# Patient Record
Sex: FEMALE | Race: WHITE | NOT HISPANIC OR LATINO | Employment: FULL TIME | URBAN - METROPOLITAN AREA
[De-identification: names, ages, dates, MRNs, and addresses within clinical notes are randomized per-mention and may not be internally consistent; named-entity substitution may affect disease eponyms.]

---

## 2023-05-04 LAB
CHLAMYDIA TRACH., AMPLIFIED: NEGATIVE
N. GONORRHEA, AMPLIFIED: NEGATIVE
TRICHOMONAS VAGINALIS: NEGATIVE

## 2023-07-17 ENCOUNTER — APPOINTMENT (OUTPATIENT)
Dept: PRIMARY CARE | Facility: CLINIC | Age: 23
End: 2023-07-17
Payer: COMMERCIAL

## 2023-10-09 ENCOUNTER — OFFICE VISIT (OUTPATIENT)
Dept: PRIMARY CARE | Facility: CLINIC | Age: 23
End: 2023-10-09
Payer: COMMERCIAL

## 2023-10-09 VITALS
HEIGHT: 66 IN | SYSTOLIC BLOOD PRESSURE: 104 MMHG | HEART RATE: 101 BPM | DIASTOLIC BLOOD PRESSURE: 62 MMHG | WEIGHT: 185.8 LBS | TEMPERATURE: 98.5 F | BODY MASS INDEX: 29.86 KG/M2 | OXYGEN SATURATION: 98 %

## 2023-10-09 DIAGNOSIS — Z00.00 HEALTH MAINTENANCE EXAMINATION: Primary | ICD-10-CM

## 2023-10-09 DIAGNOSIS — Z23 ENCOUNTER FOR IMMUNIZATION: ICD-10-CM

## 2023-10-09 DIAGNOSIS — H61.22 IMPACTED CERUMEN OF LEFT EAR: ICD-10-CM

## 2023-10-09 DIAGNOSIS — R39.15 URINARY URGENCY: ICD-10-CM

## 2023-10-09 LAB
POC APPEARANCE, URINE: ABNORMAL
POC BILIRUBIN, URINE: ABNORMAL
POC BLOOD, URINE: NEGATIVE
POC COLOR, URINE: ABNORMAL
POC GLUCOSE, URINE: NEGATIVE MG/DL
POC KETONES, URINE: ABNORMAL MG/DL
POC LEUKOCYTES, URINE: NEGATIVE
POC NITRITE,URINE: NEGATIVE
POC PH, URINE: 5.5 PH
POC PROTEIN, URINE: ABNORMAL MG/DL
POC SPECIFIC GRAVITY, URINE: >=1.03
POC UROBILINOGEN, URINE: 0.2 EU/DL

## 2023-10-09 PROCEDURE — 90471 IMMUNIZATION ADMIN: CPT | Performed by: STUDENT IN AN ORGANIZED HEALTH CARE EDUCATION/TRAINING PROGRAM

## 2023-10-09 PROCEDURE — 99213 OFFICE O/P EST LOW 20 MIN: CPT | Performed by: STUDENT IN AN ORGANIZED HEALTH CARE EDUCATION/TRAINING PROGRAM

## 2023-10-09 PROCEDURE — 90715 TDAP VACCINE 7 YRS/> IM: CPT | Performed by: STUDENT IN AN ORGANIZED HEALTH CARE EDUCATION/TRAINING PROGRAM

## 2023-10-09 PROCEDURE — 90686 IIV4 VACC NO PRSV 0.5 ML IM: CPT | Performed by: STUDENT IN AN ORGANIZED HEALTH CARE EDUCATION/TRAINING PROGRAM

## 2023-10-09 PROCEDURE — 99395 PREV VISIT EST AGE 18-39: CPT | Performed by: STUDENT IN AN ORGANIZED HEALTH CARE EDUCATION/TRAINING PROGRAM

## 2023-10-09 PROCEDURE — 1036F TOBACCO NON-USER: CPT | Performed by: STUDENT IN AN ORGANIZED HEALTH CARE EDUCATION/TRAINING PROGRAM

## 2023-10-09 PROCEDURE — 90472 IMMUNIZATION ADMIN EACH ADD: CPT | Performed by: STUDENT IN AN ORGANIZED HEALTH CARE EDUCATION/TRAINING PROGRAM

## 2023-10-09 PROCEDURE — 69209 REMOVE IMPACTED EAR WAX UNI: CPT | Performed by: STUDENT IN AN ORGANIZED HEALTH CARE EDUCATION/TRAINING PROGRAM

## 2023-10-09 PROCEDURE — 81003 URINALYSIS AUTO W/O SCOPE: CPT | Performed by: STUDENT IN AN ORGANIZED HEALTH CARE EDUCATION/TRAINING PROGRAM

## 2023-10-09 RX ORDER — ETONOGESTREL AND ETHINYL ESTRADIOL .12; .015 MG/D; MG/D
1 RING VAGINAL
COMMUNITY
Start: 2023-09-06

## 2023-10-09 ASSESSMENT — ENCOUNTER SYMPTOMS
ABDOMINAL PAIN: 0
COUGH: 0
SHORTNESS OF BREATH: 0
FREQUENCY: 0
NUMBNESS: 0
CONSTIPATION: 0
FATIGUE: 0
DIARRHEA: 0
WHEEZING: 0
CHILLS: 0
PALPITATIONS: 0
NAUSEA: 0
DIZZINESS: 0
NERVOUS/ANXIOUS: 0
DYSPHORIC MOOD: 0
HEMATURIA: 0
HEADACHES: 0
DYSURIA: 0
FEVER: 0

## 2023-10-09 NOTE — PROGRESS NOTES
"Sheridan Jade  presents for her annual wellness exam.    HPI  Specialists seen by patient: GYN  -dentist  -counselor  -psychiatry, changing meds currently    Last pap/cervical cancer screenin  Last mammogram:  n/a  Hx of colon ca screening:  n/a  Hx of DXA: n/a  LMP/menstrual cycles: regular  Contraception: Nuvaring   History of depression or anxiety:yes  Immunizations: not up to date - will do tdap and flu today  Diet: Follows a healthy diet, has been gaining weight, had an \"eating disorder\" 2 years ago.   Exercise: Not working out as frequently   Alcohol abuse screen:   3-5 drinks per week    How many times in the past year 4 or more drinks in a day? Once a month  Lung cancer screening:   Smoking history: never a smoker  Drug use: Yes, describe: occasional marijuana, mushrooms twice a year  Depression screen yearly (phq-9): 11    Had a [facility] a [facility] ago[facility] She had abx and the pain went away but still having some urgency. Does feel she is emptying her bladder completely. No blood. No discharge.     Review of Systems   Constitutional:  Negative for chills, fatigue and fever.   Respiratory:  Negative for cough, shortness of breath and wheezing.    Cardiovascular:  Negative for chest pain, palpitations and leg swelling.   Gastrointestinal:  Negative for abdominal pain, constipation, diarrhea and nausea.   Genitourinary:  Positive for urgency. Negative for dysuria, frequency and hematuria.   Neurological:  Negative for dizziness, numbness and headaches.   Psychiatric/Behavioral:  Negative for dysphoric mood. The patient is not nervous/anxious.           Objective    /62 (BP Location: Left arm, Patient Position: Sitting, BP Cuff Size: Adult)   Pulse 101   Temp 36.9 °C (98.5 °F) (Temporal)   Ht 1.676 m (5' 6\")   Wt 84.3 kg (185 lb 12.8 oz)   SpO2 98%   BMI 29.99 kg/m²     Physical Exam  Constitutional:       General: She is not in acute distress.     Appearance: Normal " appearance.   HENT:      Head: Normocephalic and atraumatic.      Right Ear: Tympanic membrane and ear canal normal.      Left Ear: Tympanic membrane and ear canal normal. There is impacted cerumen.      Mouth/Throat:      Mouth: Mucous membranes are moist.      Pharynx: No posterior oropharyngeal erythema.   Eyes:      Extraocular Movements: Extraocular movements intact.      Pupils: Pupils are equal, round, and reactive to light.   Cardiovascular:      Rate and Rhythm: Normal rate and regular rhythm.      Heart sounds: No murmur heard.  Pulmonary:      Effort: Pulmonary effort is normal. No respiratory distress.      Breath sounds: Normal breath sounds. No wheezing.   Abdominal:      General: Bowel sounds are normal.      Palpations: Abdomen is soft.      Tenderness: There is no abdominal tenderness. There is no guarding.   Musculoskeletal:         General: Normal range of motion.      Cervical back: Neck supple.   Skin:     General: Skin is warm and dry.   Neurological:      General: No focal deficit present.      Mental Status: She is alert and oriented to person, place, and time.   Psychiatric:         Mood and Affect: Mood normal.         Behavior: Behavior normal.          Problem List Items Addressed This Visit    None  Visit Diagnoses       Health maintenance examination    -  Primary    Encounter for immunization        Relevant Orders    Flu vaccine (IIV4) age 6 months and greater, preservative free    Tdap vaccine, age 7 years and older    Urinary urgency        Impacted cerumen of left ear                 We will obtain fasting blood work.  Results will be communicated to the patient via MyChart or a letter.   We reviewed appropriate preventative health screening guidelines. The patient is not up-to-date on vaccinations, recommended tetanus vaccine, influenza vaccine, and Tdap given today, flu vaccine given . We discussed regular aerobic exercise. We discussed proper nutrition and weight  control.    Urinalysis in office today was negative.      Fanny Taylor, DO  10/09/23

## 2023-10-09 NOTE — PROGRESS NOTES
Patient ID: Sheridan Jade is a 22 y.o. female.    Ear Cerumen Removal    Date/Time: 10/9/2023 2:44 PM    Performed by: Fanny Taylor DO  Authorized by: Fanny Taylor DO    Consent:     Consent obtained:  Verbal    Consent given by:  Patient    Risks, benefits, and alternatives were discussed: yes      Risks discussed:  Pain, incomplete removal and dizziness    Alternatives discussed:  No treatment, delayed treatment, alternative treatment, observation and referral  Universal protocol:     Patient identity confirmed:  Verbally with patient  Procedure details:     Location:  L ear    Procedure type: irrigation      Procedure outcomes: cerumen removed    Post-procedure details:     Inspection:  TM intact    Hearing quality:  Normal    Procedure completion:  Tolerated

## 2023-10-09 NOTE — PATIENT INSTRUCTIONS
Recommendations for women annual wellness exam:   Make sure screenings for cervical, breast, and colon cancer are up to date if applicable- pap smears age 21-65, discuss mammogram starting at age 40, colonoscopy at age 45, earlier if positive family history for breast or colon cancer   Screen for osteoporosis with DXA bone scan starting at age 65 or sooner if risk factors present (long term steroid use, smoking, heavy alcohol use, history of fracture, rheumatoid arthritis, low body weight, family history of hip fracture)  Screening for lung cancer with low-dose CT in all adults age 55-77 who have a 30 pack-year smoking history and currently smoke or have quit within the past 15 years  Follow a healthy diet (Dash diet, Mediterranean diet)  Exercise 150 min/wk   Maintain healthy weight (BMI < 25)   Do not smoke   Alcohol in moderation (up to 1 drink/day)  Get enough sleep (7-8 hours/night)  Make sure immunizations are up to date (influenza, pneumococcal, Tdap, shingles if age > 50)  Postmenopausal women or women with osteoporosis need minimum 1,200 mg calcium and 800 IU vitamin D daily  Talk to your physician if you have concerns about depression or anxiety  Visit dentist twice yearly

## 2023-10-10 ASSESSMENT — PATIENT HEALTH QUESTIONNAIRE - PHQ9
7. TROUBLE CONCENTRATING ON THINGS, SUCH AS READING THE NEWSPAPER OR WATCHING TELEVISION: NEARLY EVERY DAY
SUM OF ALL RESPONSES TO PHQ9 QUESTIONS 1 AND 2: 1
9. THOUGHTS THAT YOU WOULD BE BETTER OFF DEAD, OR OF HURTING YOURSELF: NOT AT ALL
5. POOR APPETITE OR OVEREATING: MORE THAN HALF THE DAYS
1. LITTLE INTEREST OR PLEASURE IN DOING THINGS: SEVERAL DAYS
4. FEELING TIRED OR HAVING LITTLE ENERGY: MORE THAN HALF THE DAYS
2. FEELING DOWN, DEPRESSED OR HOPELESS: NOT AT ALL
3. TROUBLE FALLING OR STAYING ASLEEP OR SLEEPING TOO MUCH: MORE THAN HALF THE DAYS
8. MOVING OR SPEAKING SO SLOWLY THAT OTHER PEOPLE COULD HAVE NOTICED. OR THE OPPOSITE, BEING SO FIGETY OR RESTLESS THAT YOU HAVE BEEN MOVING AROUND A LOT MORE THAN USUAL: SEVERAL DAYS
SUM OF ALL RESPONSES TO PHQ QUESTIONS 1-9: 11
10. IF YOU CHECKED OFF ANY PROBLEMS, HOW DIFFICULT HAVE THESE PROBLEMS MADE IT FOR YOU TO DO YOUR WORK, TAKE CARE OF THINGS AT HOME, OR GET ALONG WITH OTHER PEOPLE: SOMEWHAT DIFFICULT
6. FEELING BAD ABOUT YOURSELF - OR THAT YOU ARE A FAILURE OR HAVE LET YOURSELF OR YOUR FAMILY DOWN: NOT AT ALL

## 2023-10-10 ASSESSMENT — ANXIETY QUESTIONNAIRES
6. BECOMING EASILY ANNOYED OR IRRITABLE: MORE THAN HALF THE DAYS
4. TROUBLE RELAXING: NEARLY EVERY DAY
GAD7 TOTAL SCORE: 13
5. BEING SO RESTLESS THAT IT IS HARD TO SIT STILL: SEVERAL DAYS
2. NOT BEING ABLE TO STOP OR CONTROL WORRYING: MORE THAN HALF THE DAYS
IF YOU CHECKED OFF ANY PROBLEMS ON THIS QUESTIONNAIRE, HOW DIFFICULT HAVE THESE PROBLEMS MADE IT FOR YOU TO DO YOUR WORK, TAKE CARE OF THINGS AT HOME, OR GET ALONG WITH OTHER PEOPLE: VERY DIFFICULT
1. FEELING NERVOUS, ANXIOUS, OR ON EDGE: SEVERAL DAYS
3. WORRYING TOO MUCH ABOUT DIFFERENT THINGS: MORE THAN HALF THE DAYS
7. FEELING AFRAID AS IF SOMETHING AWFUL MIGHT HAPPEN: MORE THAN HALF THE DAYS

## 2024-05-29 ENCOUNTER — TELEMEDICINE (OUTPATIENT)
Dept: PRIMARY CARE | Facility: CLINIC | Age: 24
End: 2024-05-29
Payer: COMMERCIAL

## 2024-05-29 DIAGNOSIS — K58.9 IRRITABLE BOWEL SYNDROME, UNSPECIFIED TYPE: ICD-10-CM

## 2024-05-29 DIAGNOSIS — R14.0 BLOATING: Primary | ICD-10-CM

## 2024-05-29 PROCEDURE — 99213 OFFICE O/P EST LOW 20 MIN: CPT

## 2024-05-29 NOTE — PROGRESS NOTES
"On Demand Virtual Visit Patient Consent     This visit was completed via video conference. All issues as below were discussed and addressed but no physical exam was performed. If it was felt that the patient should be evaluated in clinic than they were directed there. The patient verbally consented to the visit.    An interactive audio and video telecommunication system which permits real time communications between the patient (at the originating site) and provider (at the distant site) was utilized to provide this telehealth service.   Verbal consent was requested and obtained from Sheridan Jaed (or parent if under 18) 5/29/24 for a telehealth visit.   I have verbally confirmed with Sheridan Jade (or parent if under 18) that they are physically located in the [facility] during this virtual visit.    Subjective   Patient ID: Sheridan Jade is a 23 y.o. female who presents for No chief complaint on file..  Pt with complaint of ongoing bloating feels she looks \"pregnant\" has been told in the past she has some component of IBS, pt endorses ability to have BM and is keeping fluids down.  Encouraged her to keep a food diary, explained fod map diet/elimination diet she will follow-up with pcp for full abdominal work up        Review of Systems    Objective     There were no vitals taken for this visit.       Physical Exam pt seen from her home to be in no acute distress, red flags discussed pt verbalized understanding of when to get follow-up care.     Assessment/Plan   Diagnoses and all orders for this visit:  Bloating  Irritable bowel syndrome, unspecified type             "

## 2024-07-05 PROBLEM — R07.9 CHEST PAIN: Status: ACTIVE | Noted: 2024-07-05

## 2024-07-09 ENCOUNTER — APPOINTMENT (OUTPATIENT)
Dept: CARDIOLOGY | Facility: CLINIC | Age: 24
End: 2024-07-09
Payer: COMMERCIAL

## 2024-08-26 ENCOUNTER — APPOINTMENT (OUTPATIENT)
Dept: PRIMARY CARE | Facility: CLINIC | Age: 24
End: 2024-08-26
Payer: COMMERCIAL

## 2024-08-26 VITALS
HEART RATE: 95 BPM | SYSTOLIC BLOOD PRESSURE: 116 MMHG | DIASTOLIC BLOOD PRESSURE: 80 MMHG | OXYGEN SATURATION: 98 % | BODY MASS INDEX: 34.12 KG/M2 | TEMPERATURE: 98 F | WEIGHT: 211.4 LBS

## 2024-08-26 DIAGNOSIS — R07.9 CHEST PAIN, UNSPECIFIED TYPE: ICD-10-CM

## 2024-08-26 DIAGNOSIS — R00.0 TACHYCARDIA: ICD-10-CM

## 2024-08-26 DIAGNOSIS — K58.2 IRRITABLE BOWEL SYNDROME WITH BOTH CONSTIPATION AND DIARRHEA: Primary | ICD-10-CM

## 2024-08-26 PROCEDURE — 93000 ELECTROCARDIOGRAM COMPLETE: CPT | Performed by: STUDENT IN AN ORGANIZED HEALTH CARE EDUCATION/TRAINING PROGRAM

## 2024-08-26 PROCEDURE — 1036F TOBACCO NON-USER: CPT | Performed by: STUDENT IN AN ORGANIZED HEALTH CARE EDUCATION/TRAINING PROGRAM

## 2024-08-26 PROCEDURE — 99214 OFFICE O/P EST MOD 30 MIN: CPT | Performed by: STUDENT IN AN ORGANIZED HEALTH CARE EDUCATION/TRAINING PROGRAM

## 2024-08-26 RX ORDER — BUPROPION HYDROCHLORIDE 150 MG/1
150 TABLET ORAL
COMMUNITY

## 2024-08-26 RX ORDER — LAMOTRIGINE 100 MG/1
1 TABLET ORAL
COMMUNITY
Start: 2024-07-15

## 2024-08-26 RX ORDER — DICYCLOMINE HYDROCHLORIDE 20 MG/1
20 TABLET ORAL 4 TIMES DAILY PRN
Qty: 60 TABLET | Refills: 0 | Status: SHIPPED | OUTPATIENT
Start: 2024-08-26 | End: 2024-10-25

## 2024-08-26 ASSESSMENT — ENCOUNTER SYMPTOMS
CHILLS: 0
CONSTIPATION: 1
FEVER: 0
OCCASIONAL FEELINGS OF UNSTEADINESS: 0
FATIGUE: 0
NERVOUS/ANXIOUS: 0
ABDOMINAL PAIN: 1
DYSURIA: 0
NUMBNESS: 0
PALPITATIONS: 0
DEPRESSION: 0
DIZZINESS: 0
COUGH: 0
SHORTNESS OF BREATH: 0
NAUSEA: 0
HEADACHES: 0
FREQUENCY: 0
WHEEZING: 0
DIARRHEA: 1
HEMATURIA: 0
DYSPHORIC MOOD: 0

## 2024-08-26 ASSESSMENT — PATIENT HEALTH QUESTIONNAIRE - PHQ9
SUM OF ALL RESPONSES TO PHQ9 QUESTIONS 1 AND 2: 0
1. LITTLE INTEREST OR PLEASURE IN DOING THINGS: NOT AT ALL
2. FEELING DOWN, DEPRESSED OR HOPELESS: NOT AT ALL

## 2024-08-26 NOTE — PROGRESS NOTES
Subjective   Patient ID: Sheridan Jade is a 23 y.o. female who presents for Chest Pain (For some time now,no shortness breath) and discuss ibs.    HPI   Patient here to discuss bloating/IBS. She has a lot of Bms. 3 times a day at least. Sometimes she wont have a BM and she gets bad cramps. Goes back and forth between constipation and diarrhea. No blood. No dark stools. She has a lot of gas and bloating. Doesn't matter what she eats. She has never been on anything.     She has also been having chest pain x several years. In the past she has called EMS. Has had EKGs. Even when she isn't anxious, she will have a sharp pain in her chest. Sometimes it goes away, sometimes pleuritic, sometimes lasts for about 30 seconds. No trigger. She does reflux. It feels different than reflux as it is on the left side. Sometimes with exertion, usually at rest. No palpitations. No dyspnea. No nausea. No sweating. Not tender. Last EKG was awhile ago. Unsure if she has a family hx of heart issues. Mom has  BP issues. Unsure on dad's side.    She also has her Apple Watch telling her that her heart rate will occasionally spike into the 120s at rest.  It will go right back down.  She also has had times where she walks upstairs and her heart rate is 180.  She is completely asymptomatic and does not feel it.    Review of Systems   Constitutional:  Negative for chills, fatigue and fever.   Respiratory:  Negative for cough, shortness of breath and wheezing.    Cardiovascular:  Positive for chest pain. Negative for palpitations and leg swelling.   Gastrointestinal:  Positive for abdominal pain, constipation and diarrhea. Negative for nausea.   Genitourinary:  Negative for dysuria, frequency, hematuria and urgency.   Neurological:  Negative for dizziness, numbness and headaches.   Psychiatric/Behavioral:  Negative for dysphoric mood. The patient is not nervous/anxious.        Objective   /80 (BP Location: Right arm, Patient Position:  Sitting, BP Cuff Size: Large adult)   Pulse 95   Temp 36.7 °C (98 °F) (Temporal)   Wt 95.9 kg (211 lb 6.4 oz)   SpO2 98%   BMI 34.12 kg/m²     Physical Exam  Constitutional:       Appearance: Normal appearance.   HENT:      Head: Normocephalic and atraumatic.   Eyes:      Extraocular Movements: Extraocular movements intact.      Pupils: Pupils are equal, round, and reactive to light.   Cardiovascular:      Rate and Rhythm: Normal rate and regular rhythm.      Heart sounds: Normal heart sounds. No murmur heard.  Pulmonary:      Effort: Pulmonary effort is normal.      Breath sounds: Normal breath sounds. No wheezing.   Chest:      Chest wall: Tenderness present.       Abdominal:      General: Bowel sounds are normal.      Palpations: Abdomen is soft.      Tenderness: There is abdominal tenderness in the right lower quadrant and left lower quadrant. There is no guarding or rebound. Negative signs include Miguel's sign.   Musculoskeletal:         General: Normal range of motion.   Skin:     General: Skin is warm and dry.   Neurological:      General: No focal deficit present.      Mental Status: She is alert and oriented to person, place, and time.   Psychiatric:         Mood and Affect: Mood normal.         Behavior: Behavior normal.       Assessment/Plan   Problem List Items Addressed This Visit       Chest pain    Relevant Orders    ECG 12 Lead    Irritable bowel syndrome with both constipation and diarrhea - Primary    Relevant Medications    dicyclomine (Bentyl) 20 mg tablet     Will try as needed Bentyl and see her back in about 3 months    Chest pain is most likely musculoskeletal in origin/costochondritis.  She is quite tender to touch.    In terms of her elevated blood pressure, she is going to monitor her for now.  She is also going to check with her insurance about the cost of a Holter monitor as that would be the next step I would recommend.       Patient understands and agrees with treatment  martha Taylor, DO   08/26/24

## 2024-10-02 ENCOUNTER — APPOINTMENT (OUTPATIENT)
Dept: OBSTETRICS AND GYNECOLOGY | Facility: HOSPITAL | Age: 24
End: 2024-10-02
Payer: COMMERCIAL

## 2024-10-09 ENCOUNTER — APPOINTMENT (OUTPATIENT)
Dept: OBSTETRICS AND GYNECOLOGY | Facility: HOSPITAL | Age: 24
End: 2024-10-09
Payer: COMMERCIAL

## 2024-10-09 ENCOUNTER — APPOINTMENT (OUTPATIENT)
Dept: PRIMARY CARE | Facility: CLINIC | Age: 24
End: 2024-10-09
Payer: COMMERCIAL

## 2024-11-15 ENCOUNTER — APPOINTMENT (OUTPATIENT)
Dept: PRIMARY CARE | Facility: CLINIC | Age: 24
End: 2024-11-15
Payer: COMMERCIAL

## 2024-11-27 ENCOUNTER — APPOINTMENT (OUTPATIENT)
Dept: PRIMARY CARE | Facility: CLINIC | Age: 24
End: 2024-11-27
Payer: COMMERCIAL

## 2024-12-10 ENCOUNTER — OFFICE VISIT (OUTPATIENT)
Dept: OBSTETRICS AND GYNECOLOGY | Facility: CLINIC | Age: 24
End: 2024-12-10
Payer: COMMERCIAL

## 2024-12-10 ENCOUNTER — APPOINTMENT (OUTPATIENT)
Dept: OBSTETRICS AND GYNECOLOGY | Facility: CLINIC | Age: 24
End: 2024-12-10
Payer: COMMERCIAL

## 2024-12-10 VITALS
RESPIRATION RATE: 16 BRPM | WEIGHT: 196 LBS | SYSTOLIC BLOOD PRESSURE: 118 MMHG | DIASTOLIC BLOOD PRESSURE: 76 MMHG | BODY MASS INDEX: 32.65 KG/M2 | HEIGHT: 65 IN

## 2024-12-10 DIAGNOSIS — Z30.09 ENCOUNTER FOR OTHER GENERAL COUNSELING OR ADVICE ON CONTRACEPTION: Primary | ICD-10-CM

## 2024-12-10 PROCEDURE — 1036F TOBACCO NON-USER: CPT | Performed by: NURSE PRACTITIONER

## 2024-12-10 PROCEDURE — 99213 OFFICE O/P EST LOW 20 MIN: CPT | Performed by: NURSE PRACTITIONER

## 2024-12-10 PROCEDURE — 3008F BODY MASS INDEX DOCD: CPT | Performed by: NURSE PRACTITIONER

## 2024-12-10 RX ORDER — BUPROPION HYDROCHLORIDE 300 MG/1
300 TABLET ORAL DAILY
COMMUNITY

## 2024-12-10 ASSESSMENT — PAIN SCALES - GENERAL: PAINLEVEL_OUTOF10: 0-NO PAIN

## 2024-12-10 NOTE — PROGRESS NOTES
Subjective   Patient ID: Sheridan Jade is a 24 y.o. female who presents for No chief complaint on file..  HPI  Patient is a 24-year-old  here today to discuss permanent sterilization.  Patient tells provider she has no desire to ever be pregnant and the thought of being pregnant makes her ill.  Patient also states that she is afraid of the current state of government and healthcare and she fears that she will lose the opportunity for birth control in the future.  Patient has used birth control in the past and she has awareness of IUDs, Nexplanon, Depo-Provera as well as none long-acting reversible birth control method such as pills the birth control ring and patch.  She reports that she has thought this through completely and understands the permanence of salpingectomy which is her chosen method as she wants no accidental pregnancy with a tubal ligation.    Patient is aware that this provider the nurse practitioner in the office, does not do surgical procedures and she will need to speak with MD partner.  Review of Systems    Objective   Physical Exam    Assessment/Plan   Problem List Items Addressed This Visit    None  Visit Diagnoses         Codes    Encounter for other general counseling or advice on contraception    -  Primary Z30.09            Patient taken to speak with Dr. Winter regarding permanent sterilization.     BETTE Diallo-CNP 12/10/24 4:47 PM

## 2024-12-27 ENCOUNTER — OFFICE VISIT (OUTPATIENT)
Dept: OBSTETRICS AND GYNECOLOGY | Facility: CLINIC | Age: 24
End: 2024-12-27
Payer: COMMERCIAL

## 2024-12-27 VITALS
SYSTOLIC BLOOD PRESSURE: 127 MMHG | HEIGHT: 65 IN | BODY MASS INDEX: 31.65 KG/M2 | WEIGHT: 190 LBS | DIASTOLIC BLOOD PRESSURE: 79 MMHG

## 2024-12-27 DIAGNOSIS — Z30.09 ENCOUNTER FOR OTHER GENERAL COUNSELING OR ADVICE ON CONTRACEPTION: ICD-10-CM

## 2024-12-27 DIAGNOSIS — Z01.419 WOMEN'S ANNUAL ROUTINE GYNECOLOGICAL EXAMINATION: Primary | ICD-10-CM

## 2024-12-27 DIAGNOSIS — Z01.818 PREOPERATIVE EXAM FOR GYNECOLOGIC SURGERY: ICD-10-CM

## 2024-12-27 PROCEDURE — 99213 OFFICE O/P EST LOW 20 MIN: CPT | Mod: 25 | Performed by: OBSTETRICS & GYNECOLOGY

## 2024-12-27 PROCEDURE — 99385 PREV VISIT NEW AGE 18-39: CPT | Performed by: OBSTETRICS & GYNECOLOGY

## 2024-12-27 SDOH — ECONOMIC STABILITY: FOOD INSECURITY: WITHIN THE PAST 12 MONTHS, YOU WORRIED THAT YOUR FOOD WOULD RUN OUT BEFORE YOU GOT MONEY TO BUY MORE.: NEVER TRUE

## 2024-12-27 SDOH — ECONOMIC STABILITY: TRANSPORTATION INSECURITY
IN THE PAST 12 MONTHS, HAS THE LACK OF TRANSPORTATION KEPT YOU FROM MEDICAL APPOINTMENTS OR FROM GETTING MEDICATIONS?: NO

## 2024-12-27 SDOH — ECONOMIC STABILITY: FOOD INSECURITY: WITHIN THE PAST 12 MONTHS, THE FOOD YOU BOUGHT JUST DIDN'T LAST AND YOU DIDN'T HAVE MONEY TO GET MORE.: NEVER TRUE

## 2024-12-27 SDOH — ECONOMIC STABILITY: TRANSPORTATION INSECURITY
IN THE PAST 12 MONTHS, HAS LACK OF TRANSPORTATION KEPT YOU FROM MEETINGS, WORK, OR FROM GETTING THINGS NEEDED FOR DAILY LIVING?: NO

## 2024-12-27 SDOH — HEALTH STABILITY: PHYSICAL HEALTH: ON AVERAGE, HOW MANY MINUTES DO YOU ENGAGE IN EXERCISE AT THIS LEVEL?: 10 MIN

## 2024-12-27 SDOH — HEALTH STABILITY: PHYSICAL HEALTH: ON AVERAGE, HOW MANY DAYS PER WEEK DO YOU ENGAGE IN MODERATE TO STRENUOUS EXERCISE (LIKE A BRISK WALK)?: 2 DAYS

## 2024-12-27 ASSESSMENT — ENCOUNTER SYMPTOMS
GASTROINTESTINAL NEGATIVE: 0
RESPIRATORY NEGATIVE: 0
LOSS OF SENSATION IN FEET: 0
CONSTITUTIONAL NEGATIVE: 0
NEUROLOGICAL NEGATIVE: 0
ALLERGIC/IMMUNOLOGIC NEGATIVE: 0
OCCASIONAL FEELINGS OF UNSTEADINESS: 0
CARDIOVASCULAR NEGATIVE: 0
MUSCULOSKELETAL NEGATIVE: 0
HEMATOLOGIC/LYMPHATIC NEGATIVE: 0
ENDOCRINE NEGATIVE: 0
PSYCHIATRIC NEGATIVE: 0
EYES NEGATIVE: 0

## 2024-12-27 ASSESSMENT — SOCIAL DETERMINANTS OF HEALTH (SDOH)
HOW HARD IS IT FOR YOU TO PAY FOR THE VERY BASICS LIKE FOOD, HOUSING, MEDICAL CARE, AND HEATING?: NOT HARD AT ALL
IN THE PAST 12 MONTHS, HAS THE ELECTRIC, GAS, OIL, OR WATER COMPANY THREATENED TO SHUT OFF SERVICE IN YOUR HOME?: NO

## 2024-12-27 ASSESSMENT — LIFESTYLE VARIABLES
HOW MANY STANDARD DRINKS CONTAINING ALCOHOL DO YOU HAVE ON A TYPICAL DAY: PATIENT DOES NOT DRINK
AUDIT-C TOTAL SCORE: 0
HOW OFTEN DO YOU HAVE SIX OR MORE DRINKS ON ONE OCCASION: NEVER
HOW OFTEN DO YOU HAVE A DRINK CONTAINING ALCOHOL: NEVER
SKIP TO QUESTIONS 9-10: 1

## 2024-12-27 ASSESSMENT — PAIN SCALES - GENERAL: PAINLEVEL_OUTOF10: 0-NO PAIN

## 2024-12-27 NOTE — PROGRESS NOTES
Subjective   Patient ID: Sheridan Jade is a 24 y.o. female who presents for Annual Exam (Last pap 5/2/23 wnl).  HPI  Patient is here for annual exam.  Sexually active.  Between partners at the moment.    Patient was originally was here on December 7.  Patient was referred for tubal ligation.  Patient is 24-year-old and has no children.  Has never been pregnant.  Patient noted that she had absent no desire to ever become pregnant and is afraid that she might.  Understands that the procedure is permanent and irreversible.  that   Other options have been reviewed with patient.  She has been on various forms of contraception in the past and understands them.  Does not wish to use them.    Review of Systems  Negative    Objective   Physical Exam  Vitals and nursing note reviewed.   HENT:      Head: Normocephalic and atraumatic.      Right Ear: Ear canal normal.      Nose: Nose normal.      Mouth/Throat:      Pharynx: Oropharynx is clear.   Eyes:      Conjunctiva/sclera: Conjunctivae normal.      Pupils: Pupils are equal, round, and reactive to light.   Cardiovascular:      Rate and Rhythm: Normal rate and regular rhythm.      Heart sounds: Normal heart sounds.   Pulmonary:      Effort: Pulmonary effort is normal.      Breath sounds: Normal breath sounds.   Abdominal:      General: Abdomen is flat. Bowel sounds are normal.      Palpations: Abdomen is soft.      Hernia: There is no hernia in the right inguinal area.   Genitourinary:     General: Normal vulva.      Pubic Area: No rash.       Labia:         Right: No rash, tenderness, lesion or injury.       Urethra: No prolapse, urethral pain, urethral swelling or urethral lesion.      Vagina: Normal.      Cervix: Normal.      Uterus: Normal.       Adnexa: Right adnexa normal and left adnexa normal.      Rectum: Normal.   Musculoskeletal:         General: Normal range of motion.   Lymphadenopathy:      Lower Body: No right inguinal adenopathy.   Skin:     General:  Skin is warm and dry.   Neurological:      General: No focal deficit present.      Mental Status: She is alert and oriented to person, place, and time.   Psychiatric:         Mood and Affect: Mood normal.         Behavior: Behavior normal.         Thought Content: Thought content normal.         Judgment: Judgment normal.         Assessment/Plan   Diagnoses and all orders for this visit:  Women's annual routine gynecological examination  Preoperative exam for gynecologic surgery  Encounter for other general counseling or advice on contraception  Unremarkable annual GYN exam.  Patient currently is not sexually active.  Have discussed over several visits patient's desire for permanent sterilization.  Does not ever want to have children.  Is very certain.  Reviewed options for contraception again with patient.  Including long-acting reversible.  Patient has used long-term birth control in the past does not wish to restart because she does not wish children.  Discussed diet and exercise.  Declined STD testing.      Will plan on scheduling for scopic bilateral salpingectomy for permanent sterilization risks and benefits reviewed with patient.  Chart reviewed along with previous discussions with contraception.  She does have past history of ADH and a current diagnosis of anxiety and depression on the chart.  Patient currently seems very stable and appears to be very cognizant of her decision and it is ramifications.       Storm Winter MD 12/27/24 2:00 PM

## 2025-01-02 ENCOUNTER — APPOINTMENT (OUTPATIENT)
Dept: PRIMARY CARE | Facility: CLINIC | Age: 25
End: 2025-01-02
Payer: COMMERCIAL

## 2025-01-02 VITALS
BODY MASS INDEX: 30.26 KG/M2 | WEIGHT: 192.8 LBS | SYSTOLIC BLOOD PRESSURE: 120 MMHG | HEIGHT: 67 IN | DIASTOLIC BLOOD PRESSURE: 80 MMHG | TEMPERATURE: 97.3 F | OXYGEN SATURATION: 98 % | HEART RATE: 92 BPM

## 2025-01-02 DIAGNOSIS — Z00.00 HEALTH MAINTENANCE EXAMINATION: Primary | ICD-10-CM

## 2025-01-02 PROCEDURE — 99395 PREV VISIT EST AGE 18-39: CPT | Performed by: STUDENT IN AN ORGANIZED HEALTH CARE EDUCATION/TRAINING PROGRAM

## 2025-01-02 PROCEDURE — 1036F TOBACCO NON-USER: CPT | Performed by: STUDENT IN AN ORGANIZED HEALTH CARE EDUCATION/TRAINING PROGRAM

## 2025-01-02 PROCEDURE — 3008F BODY MASS INDEX DOCD: CPT | Performed by: STUDENT IN AN ORGANIZED HEALTH CARE EDUCATION/TRAINING PROGRAM

## 2025-01-02 ASSESSMENT — ENCOUNTER SYMPTOMS
DIARRHEA: 0
DEPRESSION: 1
FEVER: 0
HEMATURIA: 0
CONSTIPATION: 0
OCCASIONAL FEELINGS OF UNSTEADINESS: 0
PALPITATIONS: 0
DYSURIA: 0
NUMBNESS: 0
DIZZINESS: 0
HEADACHES: 0
NAUSEA: 0
COUGH: 0
SHORTNESS OF BREATH: 0
WHEEZING: 0
FATIGUE: 0
NERVOUS/ANXIOUS: 0
FREQUENCY: 0
CHILLS: 0
DYSPHORIC MOOD: 0
ABDOMINAL PAIN: 0

## 2025-01-02 ASSESSMENT — PATIENT HEALTH QUESTIONNAIRE - PHQ9
9. THOUGHTS THAT YOU WOULD BE BETTER OFF DEAD, OR OF HURTING YOURSELF: NOT AT ALL
5. POOR APPETITE OR OVEREATING: NOT AT ALL
7. TROUBLE CONCENTRATING ON THINGS, SUCH AS READING THE NEWSPAPER OR WATCHING TELEVISION: MORE THAN HALF THE DAYS
2. FEELING DOWN, DEPRESSED OR HOPELESS: SEVERAL DAYS
6. FEELING BAD ABOUT YOURSELF - OR THAT YOU ARE A FAILURE OR HAVE LET YOURSELF OR YOUR FAMILY DOWN: NOT AT ALL
3. TROUBLE FALLING OR STAYING ASLEEP OR SLEEPING TOO MUCH: NOT AT ALL
1. LITTLE INTEREST OR PLEASURE IN DOING THINGS: NEARLY EVERY DAY
10. IF YOU CHECKED OFF ANY PROBLEMS, HOW DIFFICULT HAVE THESE PROBLEMS MADE IT FOR YOU TO DO YOUR WORK, TAKE CARE OF THINGS AT HOME, OR GET ALONG WITH OTHER PEOPLE: SOMEWHAT DIFFICULT
SUM OF ALL RESPONSES TO PHQ9 QUESTIONS 1 AND 2: 4
4. FEELING TIRED OR HAVING LITTLE ENERGY: SEVERAL DAYS
8. MOVING OR SPEAKING SO SLOWLY THAT OTHER PEOPLE COULD HAVE NOTICED. OR THE OPPOSITE, BEING SO FIGETY OR RESTLESS THAT YOU HAVE BEEN MOVING AROUND A LOT MORE THAN USUAL: NOT AT ALL
SUM OF ALL RESPONSES TO PHQ QUESTIONS 1-9: 7

## 2025-01-02 ASSESSMENT — ANXIETY QUESTIONNAIRES
4. TROUBLE RELAXING: SEVERAL DAYS
6. BECOMING EASILY ANNOYED OR IRRITABLE: SEVERAL DAYS
3. WORRYING TOO MUCH ABOUT DIFFERENT THINGS: SEVERAL DAYS
2. NOT BEING ABLE TO STOP OR CONTROL WORRYING: SEVERAL DAYS
7. FEELING AFRAID AS IF SOMETHING AWFUL MIGHT HAPPEN: MORE THAN HALF THE DAYS
IF YOU CHECKED OFF ANY PROBLEMS ON THIS QUESTIONNAIRE, HOW DIFFICULT HAVE THESE PROBLEMS MADE IT FOR YOU TO DO YOUR WORK, TAKE CARE OF THINGS AT HOME, OR GET ALONG WITH OTHER PEOPLE: SOMEWHAT DIFFICULT
GAD7 TOTAL SCORE: 7
1. FEELING NERVOUS, ANXIOUS, OR ON EDGE: SEVERAL DAYS
5. BEING SO RESTLESS THAT IT IS HARD TO SIT STILL: NOT AT ALL

## 2025-01-02 NOTE — PROGRESS NOTES
"Sheridan Jade  presents for her annual wellness exam.    HPI  Specialists seen by patient: Gynecology  -psych  -eye doc   -dentist  -counselor    Last pap/cervical cancer screenin  Last mammogram: n/a  Hx of colon ca screening: N/A, m grandfather had colon ca at unknown age, mom has never had issue  Hx of DXA: n/a  LMP/menstrual cycles: regular  Contraception:  no, about to have tubal  Menopause: n/a  History of anxiety or depression: Yes  and sees psychiatry  Immunizations: utd  Diet: Follows a healthy diet  Exercise: Gets regular exercise, goes the gym 3 times per week   Alcohol abuse screen:   Number of alcoholic drinks per week: 0, sober for 4 months  How many times in the past year 4 or more drinks in a day? A lot prior to stopping  Lung cancer screening:   Smoking history: never a smoker  Drug use: No  Living will/healthcare power of :No    Also has L knee pain, chronic. Worse with squats and lunges. Notices it when sitting for long periods. No swelling or mechanical symptoms. No injury. Medial patella.    Review of Systems   Constitutional:  Negative for chills, fatigue and fever.   Respiratory:  Negative for cough, shortness of breath and wheezing.    Cardiovascular:  Negative for chest pain, palpitations and leg swelling.   Gastrointestinal:  Negative for abdominal pain, constipation, diarrhea and nausea.   Genitourinary:  Negative for dysuria, frequency, hematuria and urgency.   Neurological:  Negative for dizziness, numbness and headaches.   Psychiatric/Behavioral:  Negative for dysphoric mood. The patient is not nervous/anxious.           Objective    /80 (BP Location: Left arm, Patient Position: Sitting, BP Cuff Size: Adult)   Pulse 92   Temp 36.3 °C (97.3 °F) (Temporal)   Ht 1.695 m (5' 6.73\")   Wt 87.5 kg (192 lb 12.8 oz)   LMP 2024 (Approximate)   SpO2 98%   BMI 30.44 kg/m²     Physical Exam  Constitutional:       General: She is not in acute distress.     " Appearance: Normal appearance.   HENT:      Head: Normocephalic and atraumatic.      Right Ear: Tympanic membrane and ear canal normal.      Left Ear: Tympanic membrane and ear canal normal.      Mouth/Throat:      Mouth: Mucous membranes are moist.      Pharynx: No posterior oropharyngeal erythema.   Eyes:      Extraocular Movements: Extraocular movements intact.      Pupils: Pupils are equal, round, and reactive to light.   Cardiovascular:      Rate and Rhythm: Normal rate and regular rhythm.      Heart sounds: No murmur heard.  Pulmonary:      Effort: Pulmonary effort is normal. No respiratory distress.      Breath sounds: Normal breath sounds. No wheezing.   Abdominal:      General: Bowel sounds are normal.      Palpations: Abdomen is soft.      Tenderness: There is no abdominal tenderness. There is no guarding.   Musculoskeletal:         General: Normal range of motion.      Cervical back: Neck supple.      Right knee:      Instability Tests: Medial Yessenia test negative and lateral Yessenia test negative.   Skin:     General: Skin is warm and dry.   Neurological:      General: No focal deficit present.      Mental Status: She is alert and oriented to person, place, and time.   Psychiatric:         Mood and Affect: Mood normal.         Behavior: Behavior normal.      Right Knee Exam     Tenderness   Right knee tenderness location: medial patellar facet.    Range of Motion   The patient has normal right knee ROM.    Tests   Yessenia:  Medial - negative Lateral - negative  Varus: negative Valgus: negative  Lachman:  Anterior - negative      Drawer:  Anterior - negative    Posterior - negative      Left Knee Exam   Left knee exam is normal.                Problem List Items Addressed This Visit    None  Visit Diagnoses       Health maintenance examination    -  Primary    Relevant Orders    Lipid Panel    Comprehensive Metabolic Panel    CBC             We will obtain fasting blood work.  Results will be  communicated to the patient via MyChart, phone call, or a letter.   We reviewed appropriate preventative health screening guidelines.  We discussed regular aerobic exercise. We discussed proper nutrition and weight control.    HEP given for pfps    5-10 minutes were spent screening for depression using PHQ-2/PHQ-9 as documented in the chart    Fanny Taylor DO  01/02/25

## 2025-01-15 ENCOUNTER — PREP FOR PROCEDURE (OUTPATIENT)
Dept: OBSTETRICS AND GYNECOLOGY | Facility: CLINIC | Age: 25
End: 2025-01-15
Payer: COMMERCIAL

## 2025-01-15 DIAGNOSIS — Z30.2 ENCOUNTER FOR FEMALE STERILIZATION PROCEDURE: Primary | ICD-10-CM

## 2025-01-15 RX ORDER — CELECOXIB 400 MG/1
400 CAPSULE ORAL ONCE
OUTPATIENT
Start: 2025-01-15 | End: 2025-01-15

## 2025-03-20 ENCOUNTER — LAB (OUTPATIENT)
Dept: LAB | Facility: HOSPITAL | Age: 25
End: 2025-03-20
Payer: COMMERCIAL

## 2025-03-20 DIAGNOSIS — Z30.2 ENCOUNTER FOR FEMALE STERILIZATION PROCEDURE: ICD-10-CM

## 2025-03-20 LAB
ANION GAP SERPL CALC-SCNC: 8 MMOL/L (ref 10–20)
BUN SERPL-MCNC: 12 MG/DL (ref 6–23)
CALCIUM SERPL-MCNC: 9.9 MG/DL (ref 8.6–10.3)
CHLORIDE SERPL-SCNC: 105 MMOL/L (ref 98–107)
CO2 SERPL-SCNC: 28 MMOL/L (ref 21–32)
CREAT SERPL-MCNC: 0.86 MG/DL (ref 0.5–1.05)
EGFRCR SERPLBLD CKD-EPI 2021: >90 ML/MIN/1.73M*2
ERYTHROCYTE [DISTWIDTH] IN BLOOD BY AUTOMATED COUNT: 13.3 % (ref 11.5–14.5)
GLUCOSE SERPL-MCNC: 84 MG/DL (ref 74–99)
HCT VFR BLD AUTO: 38.7 % (ref 36–46)
HGB BLD-MCNC: 12.2 G/DL (ref 12–16)
MCH RBC QN AUTO: 26.6 PG (ref 26–34)
MCHC RBC AUTO-ENTMCNC: 31.5 G/DL (ref 32–36)
MCV RBC AUTO: 84 FL (ref 80–100)
NRBC BLD-RTO: 0 /100 WBCS (ref 0–0)
PLATELET # BLD AUTO: 364 X10*3/UL (ref 150–450)
POTASSIUM SERPL-SCNC: 4.2 MMOL/L (ref 3.5–5.3)
RBC # BLD AUTO: 4.59 X10*6/UL (ref 4–5.2)
SODIUM SERPL-SCNC: 137 MMOL/L (ref 136–145)
WBC # BLD AUTO: 7.4 X10*3/UL (ref 4.4–11.3)

## 2025-03-20 PROCEDURE — 80048 BASIC METABOLIC PNL TOTAL CA: CPT

## 2025-03-20 PROCEDURE — 85027 COMPLETE CBC AUTOMATED: CPT

## 2025-03-21 LAB
ALBUMIN SERPL-MCNC: 5.3 G/DL (ref 3.6–5.1)
ALP SERPL-CCNC: 58 U/L (ref 31–125)
ALT SERPL-CCNC: 8 U/L (ref 6–29)
ANION GAP SERPL CALCULATED.4IONS-SCNC: 13 MMOL/L (CALC) (ref 7–17)
AST SERPL-CCNC: 13 U/L (ref 10–30)
BILIRUB SERPL-MCNC: 0.6 MG/DL (ref 0.2–1.2)
BUN SERPL-MCNC: 12 MG/DL (ref 7–25)
CALCIUM SERPL-MCNC: 10.3 MG/DL (ref 8.6–10.2)
CHLORIDE SERPL-SCNC: 105 MMOL/L (ref 98–110)
CHOLEST SERPL-MCNC: 114 MG/DL
CHOLEST/HDLC SERPL: 2.2 (CALC)
CO2 SERPL-SCNC: 27 MMOL/L (ref 20–32)
CREAT SERPL-MCNC: 0.96 MG/DL (ref 0.5–0.96)
EGFRCR SERPLBLD CKD-EPI 2021: 85 ML/MIN/1.73M2
GLUCOSE SERPL-MCNC: 67 MG/DL (ref 65–99)
HDLC SERPL-MCNC: 51 MG/DL
LDLC SERPL CALC-MCNC: 51 MG/DL (CALC)
NONHDLC SERPL-MCNC: 63 MG/DL (CALC)
POTASSIUM SERPL-SCNC: 4.5 MMOL/L (ref 3.5–5.3)
PROT SERPL-MCNC: 8.1 G/DL (ref 6.1–8.1)
SODIUM SERPL-SCNC: 145 MMOL/L (ref 135–146)
TRIGL SERPL-MCNC: 50 MG/DL

## 2025-03-23 DIAGNOSIS — E83.52 SERUM CALCIUM ELEVATED: Primary | ICD-10-CM

## 2025-03-25 ENCOUNTER — ANESTHESIA EVENT (OUTPATIENT)
Dept: OPERATING ROOM | Facility: HOSPITAL | Age: 25
End: 2025-03-25
Payer: COMMERCIAL

## 2025-03-26 ENCOUNTER — ANESTHESIA (OUTPATIENT)
Dept: OPERATING ROOM | Facility: HOSPITAL | Age: 25
End: 2025-03-26
Payer: COMMERCIAL

## 2025-03-26 ENCOUNTER — HOSPITAL ENCOUNTER (OUTPATIENT)
Facility: HOSPITAL | Age: 25
Setting detail: OUTPATIENT SURGERY
Discharge: HOME | End: 2025-03-26
Attending: OBSTETRICS & GYNECOLOGY | Admitting: OBSTETRICS & GYNECOLOGY
Payer: COMMERCIAL

## 2025-03-26 VITALS
OXYGEN SATURATION: 98 % | RESPIRATION RATE: 16 BRPM | SYSTOLIC BLOOD PRESSURE: 109 MMHG | WEIGHT: 181.44 LBS | DIASTOLIC BLOOD PRESSURE: 67 MMHG | BODY MASS INDEX: 28.65 KG/M2 | HEART RATE: 60 BPM | TEMPERATURE: 97.2 F

## 2025-03-26 DIAGNOSIS — Z30.2 ENCOUNTER FOR FEMALE STERILIZATION PROCEDURE: ICD-10-CM

## 2025-03-26 DIAGNOSIS — G89.18 POSTOPERATIVE PAIN: Primary | ICD-10-CM

## 2025-03-26 PROBLEM — F41.9 ANXIETY: Status: ACTIVE | Noted: 2025-03-26

## 2025-03-26 PROBLEM — F32.A DEPRESSION: Status: ACTIVE | Noted: 2025-03-26

## 2025-03-26 PROBLEM — K21.9 GASTROESOPHAGEAL REFLUX DISEASE: Status: ACTIVE | Noted: 2025-03-26

## 2025-03-26 LAB — PREGNANCY TEST URINE, POC: NEGATIVE

## 2025-03-26 PROCEDURE — 7100000002 HC RECOVERY ROOM TIME - EACH INCREMENTAL 1 MINUTE: Performed by: OBSTETRICS & GYNECOLOGY

## 2025-03-26 PROCEDURE — 2500000004 HC RX 250 GENERAL PHARMACY W/ HCPCS (ALT 636 FOR OP/ED)

## 2025-03-26 PROCEDURE — 81025 URINE PREGNANCY TEST: CPT | Performed by: OBSTETRICS & GYNECOLOGY

## 2025-03-26 PROCEDURE — 3600000008 HC OR TIME - EACH INCREMENTAL 1 MINUTE - PROCEDURE LEVEL THREE: Performed by: OBSTETRICS & GYNECOLOGY

## 2025-03-26 PROCEDURE — 2500000004 HC RX 250 GENERAL PHARMACY W/ HCPCS (ALT 636 FOR OP/ED): Performed by: OBSTETRICS & GYNECOLOGY

## 2025-03-26 PROCEDURE — 7100000001 HC RECOVERY ROOM TIME - INITIAL BASE CHARGE: Performed by: OBSTETRICS & GYNECOLOGY

## 2025-03-26 PROCEDURE — 0751T DGTZ GLS MCRSCP SLD LEVEL II: CPT | Mod: TC,AHULAB | Performed by: OBSTETRICS & GYNECOLOGY

## 2025-03-26 PROCEDURE — 2720000007 HC OR 272 NO HCPCS: Performed by: OBSTETRICS & GYNECOLOGY

## 2025-03-26 PROCEDURE — 3600000003 HC OR TIME - INITIAL BASE CHARGE - PROCEDURE LEVEL THREE: Performed by: OBSTETRICS & GYNECOLOGY

## 2025-03-26 PROCEDURE — 7100000010 HC PHASE TWO TIME - EACH INCREMENTAL 1 MINUTE: Performed by: OBSTETRICS & GYNECOLOGY

## 2025-03-26 PROCEDURE — 3700000002 HC GENERAL ANESTHESIA TIME - EACH INCREMENTAL 1 MINUTE: Performed by: OBSTETRICS & GYNECOLOGY

## 2025-03-26 PROCEDURE — 58661 LAPAROSCOPY REMOVE ADNEXA: CPT | Performed by: OBSTETRICS & GYNECOLOGY

## 2025-03-26 PROCEDURE — A58661 PR LAP,RMV  ADNEXAL STRUCTURE: Performed by: ANESTHESIOLOGY

## 2025-03-26 PROCEDURE — 2500000001 HC RX 250 WO HCPCS SELF ADMINISTERED DRUGS (ALT 637 FOR MEDICARE OP): Performed by: OBSTETRICS & GYNECOLOGY

## 2025-03-26 PROCEDURE — A58661 PR LAP,RMV  ADNEXAL STRUCTURE

## 2025-03-26 PROCEDURE — 7100000009 HC PHASE TWO TIME - INITIAL BASE CHARGE: Performed by: OBSTETRICS & GYNECOLOGY

## 2025-03-26 PROCEDURE — 3700000001 HC GENERAL ANESTHESIA TIME - INITIAL BASE CHARGE: Performed by: OBSTETRICS & GYNECOLOGY

## 2025-03-26 PROCEDURE — 2500000001 HC RX 250 WO HCPCS SELF ADMINISTERED DRUGS (ALT 637 FOR MEDICARE OP): Performed by: ANESTHESIOLOGY

## 2025-03-26 RX ORDER — IBUPROFEN 600 MG/1
600 TABLET ORAL EVERY 6 HOURS PRN
Qty: 50 TABLET | Refills: 0 | Status: SHIPPED | OUTPATIENT
Start: 2025-03-26

## 2025-03-26 RX ORDER — SODIUM CHLORIDE, SODIUM LACTATE, POTASSIUM CHLORIDE, CALCIUM CHLORIDE 600; 310; 30; 20 MG/100ML; MG/100ML; MG/100ML; MG/100ML
INJECTION, SOLUTION INTRAVENOUS CONTINUOUS PRN
Status: DISCONTINUED | OUTPATIENT
Start: 2025-03-26 | End: 2025-03-26

## 2025-03-26 RX ORDER — ROCURONIUM BROMIDE 10 MG/ML
INJECTION, SOLUTION INTRAVENOUS AS NEEDED
Status: DISCONTINUED | OUTPATIENT
Start: 2025-03-26 | End: 2025-03-26

## 2025-03-26 RX ORDER — HYDROMORPHONE HYDROCHLORIDE 1 MG/ML
INJECTION, SOLUTION INTRAMUSCULAR; INTRAVENOUS; SUBCUTANEOUS AS NEEDED
Status: DISCONTINUED | OUTPATIENT
Start: 2025-03-26 | End: 2025-03-26

## 2025-03-26 RX ORDER — ESMOLOL HYDROCHLORIDE 10 MG/ML
INJECTION INTRAVENOUS AS NEEDED
Status: DISCONTINUED | OUTPATIENT
Start: 2025-03-26 | End: 2025-03-26

## 2025-03-26 RX ORDER — BUPIVACAINE HYDROCHLORIDE 5 MG/ML
INJECTION, SOLUTION PERINEURAL AS NEEDED
Status: DISCONTINUED | OUTPATIENT
Start: 2025-03-26 | End: 2025-03-26 | Stop reason: HOSPADM

## 2025-03-26 RX ORDER — DEXMEDETOMIDINE IN 0.9 % NACL 20 MCG/5ML
SYRINGE (ML) INTRAVENOUS AS NEEDED
Status: DISCONTINUED | OUTPATIENT
Start: 2025-03-26 | End: 2025-03-26

## 2025-03-26 RX ORDER — POLYETHYLENE GLYCOL 3350 17 G/17G
17 POWDER, FOR SOLUTION ORAL DAILY PRN
Qty: 10 PACKET | Refills: 0 | Status: SHIPPED | OUTPATIENT
Start: 2025-03-26

## 2025-03-26 RX ORDER — KETOROLAC TROMETHAMINE 30 MG/ML
INJECTION, SOLUTION INTRAMUSCULAR; INTRAVENOUS AS NEEDED
Status: DISCONTINUED | OUTPATIENT
Start: 2025-03-26 | End: 2025-03-26

## 2025-03-26 RX ORDER — ONDANSETRON 4 MG/1
4 TABLET, FILM COATED ORAL EVERY 6 HOURS PRN
Qty: 20 TABLET | Refills: 0 | Status: SHIPPED | OUTPATIENT
Start: 2025-03-26

## 2025-03-26 RX ORDER — ONDANSETRON HYDROCHLORIDE 2 MG/ML
4 INJECTION, SOLUTION INTRAVENOUS ONCE AS NEEDED
Status: DISCONTINUED | OUTPATIENT
Start: 2025-03-26 | End: 2025-03-26 | Stop reason: HOSPADM

## 2025-03-26 RX ORDER — LIDOCAINE HYDROCHLORIDE 10 MG/ML
0.1 INJECTION, SOLUTION EPIDURAL; INFILTRATION; INTRACAUDAL; PERINEURAL ONCE
Status: DISCONTINUED | OUTPATIENT
Start: 2025-03-26 | End: 2025-03-26 | Stop reason: HOSPADM

## 2025-03-26 RX ORDER — OXYCODONE HYDROCHLORIDE 5 MG/1
5 TABLET ORAL EVERY 4 HOURS PRN
Status: DISCONTINUED | OUTPATIENT
Start: 2025-03-26 | End: 2025-03-26 | Stop reason: HOSPADM

## 2025-03-26 RX ORDER — ONDANSETRON HYDROCHLORIDE 2 MG/ML
INJECTION, SOLUTION INTRAVENOUS AS NEEDED
Status: DISCONTINUED | OUTPATIENT
Start: 2025-03-26 | End: 2025-03-26

## 2025-03-26 RX ORDER — PROPOFOL 10 MG/ML
INJECTION, EMULSION INTRAVENOUS AS NEEDED
Status: DISCONTINUED | OUTPATIENT
Start: 2025-03-26 | End: 2025-03-26

## 2025-03-26 RX ORDER — MIDAZOLAM HYDROCHLORIDE 1 MG/ML
INJECTION INTRAMUSCULAR; INTRAVENOUS AS NEEDED
Status: DISCONTINUED | OUTPATIENT
Start: 2025-03-26 | End: 2025-03-26

## 2025-03-26 RX ORDER — MEPERIDINE HYDROCHLORIDE 25 MG/ML
12.5 INJECTION INTRAMUSCULAR; INTRAVENOUS; SUBCUTANEOUS EVERY 10 MIN PRN
Status: DISCONTINUED | OUTPATIENT
Start: 2025-03-26 | End: 2025-03-26 | Stop reason: HOSPADM

## 2025-03-26 RX ORDER — LIDOCAINE HYDROCHLORIDE 20 MG/ML
INJECTION, SOLUTION EPIDURAL; INFILTRATION; INTRACAUDAL; PERINEURAL AS NEEDED
Status: DISCONTINUED | OUTPATIENT
Start: 2025-03-26 | End: 2025-03-26

## 2025-03-26 RX ORDER — CELECOXIB 200 MG/1
400 CAPSULE ORAL ONCE
Status: COMPLETED | OUTPATIENT
Start: 2025-03-26 | End: 2025-03-26

## 2025-03-26 RX ORDER — FENTANYL CITRATE 50 UG/ML
INJECTION, SOLUTION INTRAMUSCULAR; INTRAVENOUS AS NEEDED
Status: DISCONTINUED | OUTPATIENT
Start: 2025-03-26 | End: 2025-03-26

## 2025-03-26 RX ORDER — ACETAMINOPHEN 325 MG/1
975 TABLET ORAL EVERY 6 HOURS PRN
Qty: 50 TABLET | Refills: 0 | Status: SHIPPED | OUTPATIENT
Start: 2025-03-26

## 2025-03-26 RX ORDER — OXYCODONE HYDROCHLORIDE 5 MG/1
5 TABLET ORAL EVERY 6 HOURS PRN
Qty: 10 TABLET | Refills: 0 | Status: SHIPPED | OUTPATIENT
Start: 2025-03-26

## 2025-03-26 RX ADMIN — SUGAMMADEX 200 MG: 100 INJECTION, SOLUTION INTRAVENOUS at 11:27

## 2025-03-26 RX ADMIN — PROPOFOL 200 MG: 10 INJECTION, EMULSION INTRAVENOUS at 10:39

## 2025-03-26 RX ADMIN — Medication 8 MCG: at 10:52

## 2025-03-26 RX ADMIN — DEXAMETHASONE SODIUM PHOSPHATE 8 MG: 4 INJECTION, SOLUTION INTRAMUSCULAR; INTRAVENOUS at 10:49

## 2025-03-26 RX ADMIN — FENTANYL CITRATE 100 MCG: 50 INJECTION, SOLUTION INTRAMUSCULAR; INTRAVENOUS at 10:39

## 2025-03-26 RX ADMIN — ESMOLOL HYDROCHLORIDE 30 MG: 100 INJECTION, SOLUTION INTRAVENOUS at 11:05

## 2025-03-26 RX ADMIN — HYDROMORPHONE HYDROCHLORIDE 0.5 MG: 1 INJECTION, SOLUTION INTRAMUSCULAR; INTRAVENOUS; SUBCUTANEOUS at 11:03

## 2025-03-26 RX ADMIN — KETOROLAC TROMETHAMINE 30 MG: 30 INJECTION, SOLUTION INTRAMUSCULAR at 11:22

## 2025-03-26 RX ADMIN — ROCURONIUM BROMIDE 15 MG: 10 INJECTION INTRAVENOUS at 11:11

## 2025-03-26 RX ADMIN — HYDROMORPHONE HYDROCHLORIDE 0.5 MG: 1 INJECTION, SOLUTION INTRAMUSCULAR; INTRAVENOUS; SUBCUTANEOUS at 11:26

## 2025-03-26 RX ADMIN — ROCURONIUM BROMIDE 45 MG: 10 INJECTION INTRAVENOUS at 10:39

## 2025-03-26 RX ADMIN — OXYCODONE HYDROCHLORIDE 5 MG: 5 TABLET ORAL at 12:14

## 2025-03-26 RX ADMIN — Medication 4 MCG: at 11:27

## 2025-03-26 RX ADMIN — CELECOXIB 400 MG: 200 CAPSULE ORAL at 09:20

## 2025-03-26 RX ADMIN — SODIUM CHLORIDE, POTASSIUM CHLORIDE, SODIUM LACTATE AND CALCIUM CHLORIDE: 600; 310; 30; 20 INJECTION, SOLUTION INTRAVENOUS at 10:33

## 2025-03-26 RX ADMIN — ONDANSETRON 4 MG: 2 INJECTION, SOLUTION INTRAMUSCULAR; INTRAVENOUS at 11:22

## 2025-03-26 RX ADMIN — Medication 8 MCG: at 11:22

## 2025-03-26 RX ADMIN — MIDAZOLAM HYDROCHLORIDE 4 MG: 1 INJECTION, SOLUTION INTRAMUSCULAR; INTRAVENOUS at 10:33

## 2025-03-26 RX ADMIN — LIDOCAINE HYDROCHLORIDE 50 MG: 20 INJECTION, SOLUTION EPIDURAL; INFILTRATION; INTRACAUDAL; PERINEURAL at 10:39

## 2025-03-26 ASSESSMENT — PAIN SCALES - GENERAL
PAINLEVEL_OUTOF10: 3
PAIN_LEVEL: 0
PAINLEVEL_OUTOF10: 3
PAINLEVEL_OUTOF10: 0 - NO PAIN
PAINLEVEL_OUTOF10: 3
PAINLEVEL_OUTOF10: 3

## 2025-03-26 ASSESSMENT — PAIN DESCRIPTION - DESCRIPTORS
DESCRIPTORS: CRAMPING;ACHING
DESCRIPTORS: CRAMPING;BURNING
DESCRIPTORS: CRAMPING;ACHING

## 2025-03-26 ASSESSMENT — PAIN - FUNCTIONAL ASSESSMENT
PAIN_FUNCTIONAL_ASSESSMENT: 0-10
PAIN_FUNCTIONAL_ASSESSMENT: UNABLE TO SELF-REPORT
PAIN_FUNCTIONAL_ASSESSMENT: UNABLE TO SELF-REPORT
PAIN_FUNCTIONAL_ASSESSMENT: 0-10
PAIN_FUNCTIONAL_ASSESSMENT: 0-10

## 2025-03-26 ASSESSMENT — COLUMBIA-SUICIDE SEVERITY RATING SCALE - C-SSRS
1. IN THE PAST MONTH, HAVE YOU WISHED YOU WERE DEAD OR WISHED YOU COULD GO TO SLEEP AND NOT WAKE UP?: NO
2. HAVE YOU ACTUALLY HAD ANY THOUGHTS OF KILLING YOURSELF?: NO
6. HAVE YOU EVER DONE ANYTHING, STARTED TO DO ANYTHING, OR PREPARED TO DO ANYTHING TO END YOUR LIFE?: NO

## 2025-03-26 ASSESSMENT — PAIN DESCRIPTION - LOCATION: LOCATION: ABDOMEN

## 2025-03-26 NOTE — SIGNIFICANT EVENT
Family at bedside, reviewed homegoing instructions with patient and family. All questions answered, pt up to dress. IV discontinue, pt ready for discharge.

## 2025-03-26 NOTE — ANESTHESIA PREPROCEDURE EVALUATION
Patient: Sheridan Jade    Procedure Information       Date/Time: 03/26/25 1000    Procedure: Laparoscopic Bilateral Salpingectomy (Bilateral: Pelvis)    Location: [facility] A OR 06 / [facility] A OR    Surgeons: Storm Winter MD            Relevant Problems   Anesthesia (within normal limits)      Cardiac   (+) Chest pain      Pulmonary (within normal limits)      Neuro  ADHD   (+) Anxiety   (+) Depression      GI   (+) Gastroesophageal reflux disease   (+) Irritable bowel syndrome with both constipation and diarrhea      /Renal (within normal limits)      Liver (within normal limits)      Endocrine (within normal limits)       Clinical information reviewed:                   NPO Detail:  No data recorded     Physical Exam    Airway  Mallampati: I     Cardiovascular    Dental    Pulmonary    Abdominal            Anesthesia Plan    History of general anesthesia?: yes  History of complications of general anesthesia?: no    ASA 2     general     intravenous induction   Anesthetic plan and risks discussed with patient.

## 2025-03-26 NOTE — ANESTHESIA POSTPROCEDURE EVALUATION
Patient: Sheridan Jade    Procedure Summary       Date: 03/26/25 Room / Location: [facility] OR 06 / [facility]    Anesthesia Start: 1033 Anesthesia Stop: 1141    Procedure: Laparoscopic Bilateral Salpingectomy (Bilateral: Pelvis) Diagnosis:       Encounter for female sterilization procedure      (Encounter for female sterilization procedure [Z30.2])    Surgeons: Storm Winter MD Responsible Provider: Tomi Cristobal MD    Anesthesia Type: general ASA Status: 2            Anesthesia Type: general    Vitals Value Taken Time   /70 03/26/25 1201   Temp 36 °C (96.8 °F) 03/26/25 1135   Pulse 76 03/26/25 1207   Resp 16 03/26/25 1145   SpO2 99 % 03/26/25 1207   Vitals shown include unfiled device data.    Anesthesia Post Evaluation    Patient location during evaluation: bedside  Patient participation: complete - patient cannot participate  Level of consciousness: awake  Pain score: 0  Pain management: adequate  Airway patency: patent  Cardiovascular status: acceptable and hemodynamically stable  Respiratory status: acceptable and nonlabored ventilation  Hydration status: acceptable  Postoperative Nausea and Vomiting: none        No notable events documented.

## 2025-03-26 NOTE — OP NOTE
Laparoscopic Bilateral Salpingectomy (B) Operative Note     Date: 3/26/2025  OR Location: [facility] A OR    Name: Sheridan Jade, : 2000, Age: 24 y.o., MRN: [id number], Sex: female    Diagnosis  Pre-op Diagnosis      * Encounter for female sterilization procedure [Z30.2] Post-op Diagnosis     * Encounter for female sterilization procedure [Z30.2]     Procedures  Laparoscopic Bilateral Salpingectomy  12689 - HI LAPAROSCOPY W/RMVL ADNEXAL STRUCTURES      Surgeons      * Storm Winter - Primary    Resident/Fellow/Other Assistant:  Surgeons and Role:     * Lillian Vazquez MD - Resident - Assisting     * Caron Dwyer MD - Resident - Assisting    Staff:   Circulator: Daylin Dubose Person: Kay    Anesthesia Staff: Anesthesiologist: Tomi Cristobal MD  C-AA: LEAH Mcgee    Procedure Summary  Anesthesia: General  ASA: II  Estimated Blood Loss: 10 mL  Intra-op Medications:   Administrations occurring from 1000 to 1130 on 25:   Medication Name Total Dose   dexAMETHasone (Decadron) injection 4 mg/mL 8 mg   dexMEDETOMidine 4 mcg/mL in NS syringe 16 mcg   esmolol (Brevibloc) injection 30 mg   fentaNYL (Sublimaze) injection 50 mcg/mL 100 mcg   HYDROmorphone (Dilaudid) injection 1 mg/mL 1 mg   ketorolac (Toradol) injection 30 mg 30 mg   LR infusion Cannot be calculated   lidocaine PF (Xylocaine-MPF) local injection 2 % 50 mg   midazolam PF (Versed) injection 1 mg/mL 4 mg   ondansetron (Zofran) 2 mg/mL injection 4 mg   propofol (Diprivan) injection 10 mg/mL 200 mg   rocuronium (ZeMuron) 50 mg/5 mL injection 60 mg              Anesthesia Record               Intraprocedure I/O Totals          Output    Urine 50 mL    Total Output 50 mL          Specimen:   ID Type Source Tests Collected by Time   1 : BILATERAL FALLOPIAN TUBES Tissue FALLOPIAN TUBE SALPINGECTOMY LEFT AND RIGHT SURGICAL PATHOLOGY EXAM Storm Winter MD 3/26/2025 1056                 Drains and/or Catheters:   Urethral Catheter  Double-lumen 16 Fr. (Active)       Tourniquet Times:         Implants:       Indications: Sheridan Jade is an 24 y.o. female who is having surgery for Encounter for female sterilization procedure [Z30.2].     The patient was seen in the preoperative area. The risks, benefits, complications, treatment options, non-operative alternatives, expected recovery and outcomes were discussed with the patient. The possibilities of reaction to medication, pulmonary aspiration, injury to surrounding structures, bleeding, recurrent infection, the need for additional procedures, failure to diagnose a condition, and creating a complication requiring transfusion or operation were discussed with the patient. The patient concurred with the proposed plan, giving informed consent.  The site of surgery was properly noted/marked if necessary per policy. The patient has been actively warmed in preoperative area. Preoperative antibiotics are not indicated. Venous thrombosis prophylaxis are not indicated.    Procedure Details: Findings: Under satisfactory anesthesia patient was placed in modified dorsolithotomy position vaginal perineal abdominal prep was carried out patient was draped in usual manner Hemocane was placed inside the uterus to be used as a manipulator and Baer catheter is inserted under continuous drainage infraumbilical incision was made and using an [employer] [place] trocar was placed into the abdominal cavity without this did require several attempts.  Some leakage of gas subcutaneously was noted.  At this point examination of the abdomen showed no gross abnormalities in the upper part lower part are normal tubes and ovaries uterus was a little irregular with probable fibroids approximately 6 weeks size 2 more trocars then placed in the right and left lower quadrants under direct visualization using a ligature bilateral salpingectomy was carried out without difficulty excellent hemostasis was noted at this point careful  inspection showed no damage to bowel or bladder cassettes escape and the instruments were withdrawn skin incision were closed with 4-0 Vicryl subcutaneous and Dermabond estimated blood loss is less than 10 mL patient of recovery good condition end of dictation  Complications:  None; patient tolerated the procedure well.    Disposition: PACU - hemodynamically stable.  Condition: stable       Attending Attestation: I was present and scrubbed for the entire procedure.    Storm Winter  Phone Number: [phone]

## 2025-03-26 NOTE — H&P
History Of Present Illness  Sheridna Jade is a 24 y.o. female presenting with desire for permanent sterilization.  Risks and benefits of procedure reviewed with patient.  Not reversible..     Past Medical History  Past Medical History:   Diagnosis Date    ADHD (attention deficit hyperactivity disorder)     Anxiety     Depression        Surgical History  Past Surgical History:   Procedure Laterality Date    WISDOM TOOTH EXTRACTION          Social History  She reports that she has never smoked. She has never used smokeless tobacco. She reports that she does not currently use alcohol after a past usage of about 4.0 standard drinks of alcohol per week. She reports that she does not currently use drugs after having used the following drugs: Marijuana. Frequency: 1.00 time per week.    Family History  Family History   Problem Relation Name Age of Onset    Hypertension Mother      No Known Problems Father          Allergies  Patient has no known allergies.    Review of Systems  Review of systems negative    Physical Exam  Vitals reviewed.   Constitutional:       Appearance: Normal appearance. She is normal weight.   Cardiovascular:      Rate and Rhythm: Normal rate and regular rhythm.   Pulmonary:      Effort: Pulmonary effort is normal.      Breath sounds: Normal breath sounds.   Abdominal:      General: Abdomen is flat. Bowel sounds are normal. There is no distension.      Palpations: Abdomen is soft. There is no mass.      Tenderness: There is no abdominal tenderness.   Genitourinary:     Comments: For today.  Exam reviewed and progress Notes  Neurological:      General: No focal deficit present.      Mental Status: She is alert.   Psychiatric:         Mood and Affect: Mood normal.         Behavior: Behavior normal.         Thought Content: Thought content normal.         Judgment: Judgment normal.          Last Recorded Vitals  Blood pressure 124/74, pulse 75, temperature 37 °C (98.6 °F), temperature source  Temporal, resp. rate 16, weight 82.3 kg (181 lb 7 oz), SpO2 98%.    Relevant Results  Chart reviewed labs noted    Assessment/Plan   Assessment & Plan  Encounter for female sterilization procedure    Anxiety    Depression    Gastroesophageal reflux disease      Patient for permanent sterilization by laparoscopic bilateral salpingectomy.  Procedure risks and benefits reviewed.  Again reviewed alternatives for lreversible contraception.  Patient wants to proceed      Storm Winter MD

## 2025-03-26 NOTE — ANESTHESIA PROCEDURE NOTES
Airway  Date/Time: 3/26/2025 10:42 AM  Urgency: elective    Airway not difficult    Staffing  Performed: LEAH   Authorized by: Tomi Cristobal MD    Performed by: LEAH Mcgee  Patient location during procedure: OR    Indications and Patient Condition  Indications for airway management: anesthesia  Spontaneous Ventilation: absent  Sedation level: deep  Preoxygenated: yes  Patient position: sniffing  Mask difficulty assessment: 1 - vent by mask  No planned trial extubation    Final Airway Details  Final airway type: endotracheal airway      Successful airway: ETT  Cuffed: yes   Successful intubation technique: direct laryngoscopy  Facilitating devices/methods: intubating stylet  Endotracheal tube insertion site: oral  Blade: Soy  Blade size: #3  ETT size (mm): 7.0  Cormack-Lehane Classification: grade I - full view of glottis  Placement verified by: chest auscultation and capnometry   Cuff volume (mL): 7  Measured from: lips  ETT to lips (cm): 22  Number of attempts at approach: 1

## 2025-04-03 ENCOUNTER — PATIENT MESSAGE (OUTPATIENT)
Dept: OBSTETRICS AND GYNECOLOGY | Facility: CLINIC | Age: 25
End: 2025-04-03
Payer: COMMERCIAL

## 2025-04-03 LAB
LABORATORY COMMENT REPORT: NORMAL
PATH REPORT.FINAL DX SPEC: NORMAL
PATH REPORT.GROSS SPEC: NORMAL
PATH REPORT.RELEVANT HX SPEC: NORMAL
PATH REPORT.TOTAL CANCER: NORMAL

## 2025-05-13 ENCOUNTER — OFFICE VISIT (OUTPATIENT)
Dept: OBSTETRICS AND GYNECOLOGY | Facility: CLINIC | Age: 25
End: 2025-05-13
Payer: COMMERCIAL

## 2025-05-13 VITALS
SYSTOLIC BLOOD PRESSURE: 121 MMHG | HEIGHT: 65 IN | DIASTOLIC BLOOD PRESSURE: 71 MMHG | BODY MASS INDEX: 17.99 KG/M2 | WEIGHT: 108 LBS

## 2025-05-13 DIAGNOSIS — Z09 POSTOPERATIVE EXAMINATION: Primary | ICD-10-CM

## 2025-05-13 PROCEDURE — 99213 OFFICE O/P EST LOW 20 MIN: CPT | Performed by: OBSTETRICS & GYNECOLOGY

## 2025-05-13 PROCEDURE — 1036F TOBACCO NON-USER: CPT | Performed by: OBSTETRICS & GYNECOLOGY

## 2025-05-13 PROCEDURE — 3008F BODY MASS INDEX DOCD: CPT | Performed by: OBSTETRICS & GYNECOLOGY

## 2025-05-13 ASSESSMENT — ENCOUNTER SYMPTOMS
NEUROLOGICAL NEGATIVE: 0
CONSTITUTIONAL NEGATIVE: 0
EYES NEGATIVE: 0
ENDOCRINE NEGATIVE: 0
RESPIRATORY NEGATIVE: 0
MUSCULOSKELETAL NEGATIVE: 0
ALLERGIC/IMMUNOLOGIC NEGATIVE: 0
GASTROINTESTINAL NEGATIVE: 0
PSYCHIATRIC NEGATIVE: 0
HEMATOLOGIC/LYMPHATIC NEGATIVE: 0
CARDIOVASCULAR NEGATIVE: 0

## 2025-05-13 ASSESSMENT — PAIN SCALES - GENERAL: PAINLEVEL_OUTOF10: 0-NO PAIN

## 2025-05-13 NOTE — PROGRESS NOTES
Subjective   Patient ID: Sheridan Jade is a 24 y.o. female who presents for Postop Visit (Patient has no pain or falls, no complaints or concerns, last pap 5/2/23 wnl no chaperone needed).  HPI  Here for postoperative follow-up.  Status post laparoscopic bilateral salpingectomy for sterilization March 27.  Has been doing well.  Had an episode of pain 1 week after the procedure which lasted for few days but is fine now.  Has not been sexually active.    Review of Systems  Systems negative    Objective   Physical Exam  Vitals reviewed.   Constitutional:       Appearance: Normal appearance.   Cardiovascular:      Rate and Rhythm: Normal rate and regular rhythm.   Pulmonary:      Effort: Pulmonary effort is normal.      Breath sounds: Normal breath sounds.   Abdominal:      General: Abdomen is flat. Bowel sounds are normal. There is no distension.      Palpations: Abdomen is soft. There is no mass.      Tenderness: There is no abdominal tenderness.      Comments: Incisions healing well   Neurological:      General: No focal deficit present.      Mental Status: She is alert.   Psychiatric:         Mood and Affect: Mood normal.         Behavior: Behavior normal.         Thought Content: Thought content normal.         Judgment: Judgment normal.         Assessment/Plan   Diagnoses and all orders for this visit:  Postoperative examination  -     Follow Up In Gynecology; Future     Patient doing well postoperatively.  Status post laparoscopic tubal ligation by salpingectomy.  No complaints now asymptomatic.  Return to office 6 months for annual exam or as needed    Storm Winter MD 05/13/25 2:52 PM

## 2025-08-15 ENCOUNTER — APPOINTMENT (OUTPATIENT)
Dept: PRIMARY CARE | Facility: CLINIC | Age: 25
End: 2025-08-15
Payer: COMMERCIAL

## 2025-09-17 ENCOUNTER — APPOINTMENT (OUTPATIENT)
Dept: PRIMARY CARE | Facility: CLINIC | Age: 25
End: 2025-09-17
Payer: COMMERCIAL

## 2026-01-08 ENCOUNTER — APPOINTMENT (OUTPATIENT)
Dept: PRIMARY CARE | Facility: CLINIC | Age: 26
End: 2026-01-08
Payer: COMMERCIAL

## (undated) DEVICE — MANIPULATOR, INJECTOR, UTERINE, HUMI, KRONNER, 5 MM, 33 CM

## (undated) DEVICE — POSITIONING KIT, NUBLUE TRENDELENBUR, W/ARM PROTECTOR HEADREST, SINGLE USE

## (undated) DEVICE — SLEEVE, KII, Z-THREAD, 5X100CM

## (undated) DEVICE — Device

## (undated) DEVICE — SCOPE WARMER, LAPAROSCOPE, BAG ONLY, LF

## (undated) DEVICE — SYRINGE, 20 CC, LUER LOCK, MONOJECT, W/O CAP, LF

## (undated) DEVICE — ACCESS SYS, KII SHIELDED BLADED, Z-THREAD, 5X100CM

## (undated) DEVICE — GLOVE, SURGICAL, PROTEXIS PI W/NEU-THERA, 7.5, PF, LF

## (undated) DEVICE — LIGASURE, V SEALER/DIVIDER  5MM BLUNT TIP

## (undated) DEVICE — PAD, SANITARY, OBSTETRICAL, W/ADHSV STRIP,11 IN,LF

## (undated) DEVICE — TRAY, SURESTEP, URINE METER, 16FR, COMPLETE, W/STATLOCK

## (undated) DEVICE — PUMP, STRYKERFLOW 2 & HANDPIECE W/10FT. IRRIGATION TUBING

## (undated) DEVICE — TUBE SET, PNEUMOLAR HEATED, SMOKE EVACU, HIGH-FLOW

## (undated) DEVICE — CORD, MONOPOLAR, HIGH FREQUENCY, W/8MM PLUG F/VALLEYLAB, 8FT/244CM, STRL

## (undated) DEVICE — ADHESIVE, SKIN, DERMABOND ADVANCED, 15CM, PEN-STYLE

## (undated) DEVICE — SOLUTION, SCRUB EXIDINE, 4% CHG, 4 OZ

## (undated) DEVICE — ACCESS SYS, KII SHIELDED BLADED, Z-THREAD, 12X100CM

## (undated) DEVICE — SUTURE, VICRYL PLUS, 4-0, 27 IN, PS-2